# Patient Record
Sex: FEMALE | Race: WHITE | NOT HISPANIC OR LATINO | Employment: FULL TIME | URBAN - METROPOLITAN AREA
[De-identification: names, ages, dates, MRNs, and addresses within clinical notes are randomized per-mention and may not be internally consistent; named-entity substitution may affect disease eponyms.]

---

## 2018-11-26 PROBLEM — J93.9 PNEUMOTHORAX ON LEFT: Status: ACTIVE | Noted: 2018-11-26

## 2018-11-28 PROBLEM — S22.32XA CLOSED FRACTURE OF ONE RIB OF LEFT SIDE: Status: ACTIVE | Noted: 2018-11-28
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
## 2020-04-14 ENCOUNTER — OFFICE VISIT (OUTPATIENT)
Dept: CARDIOLOGY | Facility: CLINIC | Age: 35
End: 2020-04-14
Payer: COMMERCIAL

## 2020-04-14 DIAGNOSIS — I10 ESSENTIAL HYPERTENSION: ICD-10-CM

## 2020-04-14 PROCEDURE — 99203 PR OFFICE/OUTPT VISIT, NEW, LEVL III, 30-44 MIN: ICD-10-PCS | Mod: 95,,, | Performed by: INTERNAL MEDICINE

## 2020-04-14 PROCEDURE — 99203 OFFICE O/P NEW LOW 30 MIN: CPT | Mod: 95,,, | Performed by: INTERNAL MEDICINE

## 2020-04-14 NOTE — PROGRESS NOTES
Subjective:    Patient ID:  Crystal Keating is a 34 y.o. female who presents for evaluation of cmp    The patient location is: home  The chief complaint leading to consultation is: cmp  Visit type: audiovisual  Total time spent with patient: 20 min  Each patient to whom he or she provides medical services by telemedicine is:  (1) informed of the relationship between the physician and patient and the respective role of any other health care provider with respect to management of the patient; and (2) notified that he or she may decline to receive medical services by telemedicine and may withdraw from such care at any time.        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   Cardiovascular: Negative for chest pain, dyspnea on exertion, leg swelling, palpitations and syncope.   Respiratory: Negative for cough and shortness of breath.    Gastrointestinal: Negative.    Neurological: Negative for weakness.   All other systems reviewed and are negative.         Assessment:       1. Cardiomyopathy, peripartum, postpartum    2. Essential hypertension         Plan:     Will do CCFD next month  Call with results

## 2021-04-06 ENCOUNTER — CLINICAL SUPPORT (OUTPATIENT)
Dept: CARDIOLOGY | Facility: CLINIC | Age: 36
End: 2021-04-06
Attending: INTERNAL MEDICINE
Payer: COMMERCIAL

## 2021-04-06 VITALS — WEIGHT: 136 LBS | HEIGHT: 63 IN | BODY MASS INDEX: 24.1 KG/M2

## 2021-04-06 PROCEDURE — 93306 TTE W/DOPPLER COMPLETE: CPT | Mod: S$GLB,,, | Performed by: INTERNAL MEDICINE

## 2021-04-06 PROCEDURE — 99999 PR PBB SHADOW E&M-EST. PATIENT-LVL I: ICD-10-PCS | Mod: PBBFAC,,,

## 2021-04-06 PROCEDURE — 99999 PR PBB SHADOW E&M-EST. PATIENT-LVL I: CPT | Mod: PBBFAC,,,

## 2021-04-06 PROCEDURE — 93306 ECHO (CUPID ONLY): ICD-10-PCS | Mod: S$GLB,,, | Performed by: INTERNAL MEDICINE

## 2021-04-07 LAB
ASCENDING AORTA: 2.76 CM
AV INDEX (PROSTH): 0.69
AV MEAN GRADIENT: 4 MMHG
AV PEAK GRADIENT: 7 MMHG
AV VALVE AREA: 2.42 CM2
AV VELOCITY RATIO: 0.66
BSA FOR ECHO PROCEDURE: 1.66 M2
CV ECHO LV RWT: 0.43 CM
DOP CALC AO PEAK VEL: 1.33 M/S
DOP CALC AO VTI: 25.5 CM
DOP CALC LVOT AREA: 3.5 CM2
DOP CALC LVOT DIAMETER: 2.11 CM
DOP CALC LVOT PEAK VEL: 0.88 M/S
DOP CALC LVOT STROKE VOLUME: 61.62 CM3
DOP CALCLVOT PEAK VEL VTI: 17.63 CM
E WAVE DECELERATION TIME: 174.14 MSEC
E/A RATIO: 0.82
E/E' RATIO: 4.92 M/S
ECHO LV POSTERIOR WALL: 0.85 CM (ref 0.6–1.1)
EJECTION FRACTION: 60 %
FRACTIONAL SHORTENING: 36 % (ref 28–44)
INTERVENTRICULAR SEPTUM: 0.78 CM (ref 0.6–1.1)
IVRT: 128.45 MSEC
LA MAJOR: 4.22 CM
LA MINOR: 4.99 CM
LA WIDTH: 2.95 CM
LEFT ATRIUM SIZE: 3.26 CM
LEFT ATRIUM VOLUME INDEX: 22.8 ML/M2
LEFT ATRIUM VOLUME: 37.38 CM3
LEFT INTERNAL DIMENSION IN SYSTOLE: 2.55 CM (ref 2.1–4)
LEFT VENTRICLE DIASTOLIC VOLUME INDEX: 41.65 ML/M2
LEFT VENTRICLE DIASTOLIC VOLUME: 68.31 ML
LEFT VENTRICLE MASS INDEX: 57 G/M2
LEFT VENTRICLE SYSTOLIC VOLUME INDEX: 14.3 ML/M2
LEFT VENTRICLE SYSTOLIC VOLUME: 23.38 ML
LEFT VENTRICULAR INTERNAL DIMENSION IN DIASTOLE: 3.96 CM (ref 3.5–6)
LEFT VENTRICULAR MASS: 94.26 G
LV LATERAL E/E' RATIO: 3.76 M/S
LV SEPTAL E/E' RATIO: 7.11 M/S
MV A" WAVE DURATION": 9.99 MSEC
MV PEAK A VEL: 0.78 M/S
MV PEAK E VEL: 0.64 M/S
PISA TR MAX VEL: 2.22 M/S
PULM VEIN S/D RATIO: 2.14
PV PEAK D VEL: 0.29 M/S
PV PEAK S VEL: 0.62 M/S
RA MAJOR: 3.87 CM
RA PRESSURE: 3 MMHG
RA WIDTH: 3.22 CM
RIGHT VENTRICULAR END-DIASTOLIC DIMENSION: 3.37 CM
RV TISSUE DOPPLER FREE WALL SYSTOLIC VELOCITY 1 (APICAL 4 CHAMBER VIEW): 9.74 CM/S
SINUS: 2.85 CM
STJ: 2.53 CM
TDI LATERAL: 0.17 M/S
TDI SEPTAL: 0.09 M/S
TDI: 0.13 M/S
TR MAX PG: 20 MMHG
TRICUSPID ANNULAR PLANE SYSTOLIC EXCURSION: 1.76 CM
TV REST PULMONARY ARTERY PRESSURE: 23 MMHG

## 2021-05-06 PROBLEM — Z86.79 HISTORY OF CARDIOMYOPATHY IN ADULTHOOD: Status: ACTIVE | Noted: 2021-05-06

## 2021-05-10 ENCOUNTER — PATIENT MESSAGE (OUTPATIENT)
Dept: RESEARCH | Facility: HOSPITAL | Age: 36
End: 2021-05-10

## 2021-12-28 ENCOUNTER — OFFICE VISIT (OUTPATIENT)
Dept: CARDIOLOGY | Facility: CLINIC | Age: 36
End: 2021-12-28
Payer: COMMERCIAL

## 2021-12-28 VITALS
DIASTOLIC BLOOD PRESSURE: 76 MMHG | HEART RATE: 95 BPM | SYSTOLIC BLOOD PRESSURE: 115 MMHG | HEIGHT: 60 IN | BODY MASS INDEX: 30.99 KG/M2 | WEIGHT: 157.88 LBS

## 2021-12-28 DIAGNOSIS — I10 ESSENTIAL HYPERTENSION: ICD-10-CM

## 2021-12-28 PROCEDURE — 1160F PR REVIEW ALL MEDS BY PRESCRIBER/CLIN PHARMACIST DOCUMENTED: ICD-10-PCS | Mod: CPTII,S$GLB,, | Performed by: INTERNAL MEDICINE

## 2021-12-28 PROCEDURE — 3008F BODY MASS INDEX DOCD: CPT | Mod: CPTII,S$GLB,, | Performed by: INTERNAL MEDICINE

## 2021-12-28 PROCEDURE — 1159F MED LIST DOCD IN RCRD: CPT | Mod: CPTII,S$GLB,, | Performed by: INTERNAL MEDICINE

## 2021-12-28 PROCEDURE — 3074F SYST BP LT 130 MM HG: CPT | Mod: CPTII,S$GLB,, | Performed by: INTERNAL MEDICINE

## 2021-12-28 PROCEDURE — 3078F DIAST BP <80 MM HG: CPT | Mod: CPTII,S$GLB,, | Performed by: INTERNAL MEDICINE

## 2021-12-28 PROCEDURE — 3074F PR MOST RECENT SYSTOLIC BLOOD PRESSURE < 130 MM HG: ICD-10-PCS | Mod: CPTII,S$GLB,, | Performed by: INTERNAL MEDICINE

## 2021-12-28 PROCEDURE — 3078F PR MOST RECENT DIASTOLIC BLOOD PRESSURE < 80 MM HG: ICD-10-PCS | Mod: CPTII,S$GLB,, | Performed by: INTERNAL MEDICINE

## 2021-12-28 PROCEDURE — 99999 PR PBB SHADOW E&M-EST. PATIENT-LVL III: ICD-10-PCS | Mod: PBBFAC,,, | Performed by: INTERNAL MEDICINE

## 2021-12-28 PROCEDURE — 1159F PR MEDICATION LIST DOCUMENTED IN MEDICAL RECORD: ICD-10-PCS | Mod: CPTII,S$GLB,, | Performed by: INTERNAL MEDICINE

## 2021-12-28 PROCEDURE — 99214 PR OFFICE/OUTPT VISIT, EST, LEVL IV, 30-39 MIN: ICD-10-PCS | Mod: S$GLB,,, | Performed by: INTERNAL MEDICINE

## 2021-12-28 PROCEDURE — 99999 PR PBB SHADOW E&M-EST. PATIENT-LVL III: CPT | Mod: PBBFAC,,, | Performed by: INTERNAL MEDICINE

## 2021-12-28 PROCEDURE — 99214 OFFICE O/P EST MOD 30 MIN: CPT | Mod: S$GLB,,, | Performed by: INTERNAL MEDICINE

## 2021-12-28 PROCEDURE — 3008F PR BODY MASS INDEX (BMI) DOCUMENTED: ICD-10-PCS | Mod: CPTII,S$GLB,, | Performed by: INTERNAL MEDICINE

## 2021-12-28 PROCEDURE — 1160F RVW MEDS BY RX/DR IN RCRD: CPT | Mod: CPTII,S$GLB,, | Performed by: INTERNAL MEDICINE

## 2022-06-07 ENCOUNTER — TELEPHONE (OUTPATIENT)
Dept: MATERNAL FETAL MEDICINE | Facility: CLINIC | Age: 37
End: 2022-06-07
Payer: COMMERCIAL

## 2022-06-21 PROBLEM — O24.419 GDM (GESTATIONAL DIABETES MELLITUS): Status: ACTIVE | Noted: 2022-06-21

## 2023-10-31 ENCOUNTER — OFFICE VISIT (OUTPATIENT)
Dept: MATERNAL FETAL MEDICINE | Facility: CLINIC | Age: 38
End: 2023-10-31
Payer: COMMERCIAL

## 2023-10-31 DIAGNOSIS — O24.414 INSULIN CONTROLLED GESTATIONAL DIABETES MELLITUS (GDM) IN THIRD TRIMESTER: ICD-10-CM

## 2023-10-31 PROCEDURE — 99215 PR OFFICE/OUTPT VISIT, EST, LEVL V, 40-54 MIN: ICD-10-PCS | Mod: 95,,, | Performed by: OBSTETRICS & GYNECOLOGY

## 2023-10-31 PROCEDURE — 99215 OFFICE O/P EST HI 40 MIN: CPT | Mod: 95,,, | Performed by: OBSTETRICS & GYNECOLOGY

## 2023-10-31 NOTE — ASSESSMENT & PLAN NOTE
Patient diagnosed with GDM and has been on NPH at [facility] with dosing that she titrates.   Blood glucose log reviewed and is erratic. Most fastings elevated and post dinner glucoses elevated when checked. Post breakfast not recorded.    Briefly reviewed the pathophysiology of GDM and insulin dosing.        US today with AC > 99th percentile. Also has h/o of shoulder dystocia. Discussed importance of compliance and need to optimize glycemic control to improve outcomes.     I counseled the patient regarding the risks of gestational diabetes. Patients requiring medical therapy have an increased risk of stillbirth.  Discussed that  outcome is linked to her ability to achieve glycemic control.  The goal of treatment is to have a fasting blood sugar between 70 and 95 mg/dL and 2 hour postprandials less than 120 mg/dL.   We discussed the frequency of blood sugar monitoring and goal blood sugars. She is meeting with a diabetic educator today. I would recommend obtaining serial growth ultrasounds in the third trimester to monitor for macrosomia especially given today's ultrasound.    We discussed different approaches to treatment including split mix insulin, adding metformin, and/or starting twice a day NPH. I recommended that we start split mix and she was agreeable.    Most women with GDM are cured by delivery. All medications can be stopped postpartum. However, some women with GDM have undiagnosed type 2 diabetes. Therefore, I recommend obtaining a postpartum fasting blood sugar prior to discharge. Approximately 20% of women with GDM will have glucose intolerance or type 2 DM at the postpartum visit. A 2 hour glucose tolerance test should be performed 6-8 weeks postpartum. If the patient is breastfeeding, it is reasonable to delay this as appropriate. Additionally, women with GDM are at increased risk of developing type 2 DM later in life. Therefore, establishing with a primary MD who can perform annual diabetes  screening is appropriate.     Recommendations:   MFM will review blood sugars in 1 week ; We reinforced checking 4 x/day and reinforced goal blood sugars   Regimen:   o Change regimen to:  - NPH 10 units in AM and 20 units at bedtime  - Novolog/Humalog 4 units at dinner; explained that she may require some at breakfast too but at this point without post breakfast values I am hesitant to start.    Recommend growth scans every 4-6 weeks, starting at 28 weeks gestation   Recommend starting antepartum testing at 32 weeks gestation (weekly NST+AFV or BPP); twice weekly testing is recommended if blood sugars are poorly controlled.   Check fasting blood sugar in hospital postpartum.   If normal, discontinue therapy and monitoring and recommend repeat postpartum glucose testing in 6-8 weeks postpartum using a 75 g oral glucose tolerance test.   If fasting blood glucose is between 100-125 mg/dl or impaired glucose tolerance is noted on 2 hour glucose test, refer to primary care as appropriate.    An ultrasound for estimated fetal weight should be obtained within 3 weeks of anticipated delivery; if the EFW is >= 4500 grams, a  should be offered.    Delivery timing:    Well-controlled oral agent or insulin required: 39 0/7 - 39 6/ 7 weeks gestation  Poorly controlled on oral agent or insulin: 38 0/7 - 38 6/7 weeks gestation

## 2023-10-31 NOTE — ASSESSMENT & PLAN NOTE
See previous MFM notes for full consultation.   History of PPCM after delivery in 2015 - EF 30%, eventually recovered within 12 months.  Had a normal pregnancy, delivery, and PP course in 2022.   Echo 4/2022 WNL - see report in Epic. EF 55-60%.    Results for orders placed during the hospital encounter of 09/14/23    Interpretation Summary    Left Ventricle: The left ventricle is normal in size. Normal wall motion. There is normal systolic function. Ejection fraction by visual approximation is 55-60%. There is normal diastolic function.    Right Ventricle: Normal right ventricular cavity size. Systolic function is normal.    Pulmonary Artery: The estimated pulmonary artery systolic pressure is 27 mmHg.    IVC/SVC: Normal venous pressure at 3 mmHg.    Denies CP, SOB, palpitations. Reports in prior pregnancy developed symptoms around 34-35 weeks. /74 today    Recommendations:    Continue follow-up with cardiology; recommend echocardiogram around 34 weeks and again in the immediate postpartum period--will place order   M follow-up every 4-6 weeks    Medications:  ? Beta blockers can be used as needed  ? Hypertension should be managed closely with goal of 130/80 or less.   ? Hydralazine may be used if needed.  ? Diuretics, may be used if needed  ? Digoxin may be used if needed  ? Anticoagulation is indicated if the EF drops to 30% or lower.   ? Low dose aspirin 81 mg daily, starting at 12-16 weeks gestation   Labs:  ? Baseline preeclampsia labs (CBC, CMP, P/C ratio)------------------To be drawn by Dr. Larson; notify MD if there are any abnormalities.   ? BNP and CMP, magnesium, phosphorus every 1-2 months   Targeted anatomy - completed.    Consider anesthesiology consultation in third trimester - primary OB to coordinate   Endocarditis prophylaxis in labor is not recommended   Serial growth ultrasounds every 4-6 weeks, starting at 26-28 weeks    Antepartum testing at 32 weeks will be dictated by  insulin dependent GDM   Delivery at approximately 38 or 39 weeks gestation, pending future echocardiogram findings. If LVEF remains normal, delivery [id number] is likely appropriate.   ? Mode of delivery likely can be VD unless significant CV decompensation and then CS may be recommended   ? Telemetry intrapartum and for 48 hours postpartum  ? If any cardiac symptoms, consider admission to ICU postpartum for close observation, but if echo normal and asymptomatic may be unnecessary   ? Strict intake and output during labor, delivery, and postpartum period  ? CVL and arterial line at time during delivery/postpartum period per anesthesia/ICU-again will depend upon status.   Cardiac/ED/OB ED precautions discussed

## 2023-10-31 NOTE — PROGRESS NOTES
Maternal Fetal Medicine follow up consult    SUBJECTIVE:     Crystal Ramirez is a 37 y.o.  female with IUP at 29w2d who is seen in follow up consultation by MFM.  Pregnancy complications include:   Problem   Cardiomyopathy, Peripartum, Postpartum   Insulin Controlled Gestational Diabetes Mellitus (Gdm) in Third Trimester       Previous notes reviewed.   No changes to medical, surgical, family, social, or obstetric history.    Interval history since last MFM visit: no interval problems    Medications:  NPH 10-20 units qhs      Care team members:  Dr. Zhong - Primary OB       OBJECTIVE:   /74 P 109    Ultrasound performed. See viewpoint for full ultrasound report.      ASSESSMENT/PLAN:     37 y.o.  female with IUP at 29w2d    Cardiomyopathy, peripartum, postpartum  See previous MFM notes for full consultation.   History of PPCM after delivery in  - EF 30%, eventually recovered within 12 months.  Had a normal pregnancy, delivery, and PP course in .   Echo 2022 WNL - see report in Epic. EF 55-60%.    Results for orders placed during the hospital encounter of 23    Interpretation Summary    Left Ventricle: The left ventricle is normal in size. Normal wall motion. There is normal systolic function. Ejection fraction by visual approximation is 55-60%. There is normal diastolic function.    Right Ventricle: Normal right ventricular cavity size. Systolic function is normal.    Pulmonary Artery: The estimated pulmonary artery systolic pressure is 27 mmHg.    IVC/SVC: Normal venous pressure at 3 mmHg.    Denies CP, SOB, palpitations. Reports in prior pregnancy developed symptoms around 34-35 weeks. /74 today    Recommendations:   Continue follow-up with cardiology; recommend echocardiogram around 34 weeks and again in the immediate postpartum period--will place order  MFM follow-up every 4-6 weeks   Medications:  Beta blockers can be used as needed  Hypertension should be managed  closely with goal of 130/80 or less.   Hydralazine may be used if needed.  Diuretics, may be used if needed  Digoxin may be used if needed  Anticoagulation is indicated if the EF drops to 30% or lower.   Low dose aspirin 81 mg daily, starting at 12-16 weeks gestation  Labs:  Baseline preeclampsia labs (CBC, CMP, P/C ratio)------------------To be drawn by Dr. Larson; notify MD if there are any abnormalities.   BNP and CMP, magnesium, phosphorus every 1-2 months  Targeted anatomy - completed.   Consider anesthesiology consultation in third trimester - primary OB to coordinate  Endocarditis prophylaxis in labor is not recommended  Serial growth ultrasounds every 4-6 weeks, starting at 26-28 weeks   Antepartum testing at 32 weeks will be dictated by insulin dependent GDM  Delivery at approximately 38 or 39 weeks gestation, pending future echocardiogram findings. If LVEF remains normal, delivery [id number]-59x1w is likely appropriate.   Mode of delivery likely can be VD unless significant CV decompensation and then CS may be recommended   Telemetry intrapartum and for 48 hours postpartum  If any cardiac symptoms, consider admission to ICU postpartum for close observation, but if echo normal and asymptomatic may be unnecessary   Strict intake and output during labor, delivery, and postpartum period  CVL and arterial line at time during delivery/postpartum period per anesthesia/ICU-again will depend upon status.  Cardiac/ED/OB ED precautions discussed     Insulin controlled gestational diabetes mellitus (GDM) in third trimester  Patient diagnosed with GDM and has been on NPH at [facility] with dosing that she titrates.   Blood glucose log reviewed and is erratic. Most fastings elevated and post dinner glucoses elevated when checked. Post breakfast not recorded.    Briefly reviewed the pathophysiology of GDM and insulin dosing.        US today with AC > 99th percentile. Also has h/o of shoulder dystocia. Discussed importance of  compliance and need to optimize glycemic control to improve outcomes.     I counseled the patient regarding the risks of gestational diabetes. Patients requiring medical therapy have an increased risk of stillbirth.  Discussed that  outcome is linked to her ability to achieve glycemic control.  The goal of treatment is to have a fasting blood sugar between 70 and 95 mg/dL and 2 hour postprandials less than 120 mg/dL.   We discussed the frequency of blood sugar monitoring and goal blood sugars. She is meeting with a diabetic educator today. I would recommend obtaining serial growth ultrasounds in the third trimester to monitor for macrosomia especially given today's ultrasound.    We discussed different approaches to treatment including split mix insulin, adding metformin, and/or starting twice a day NPH. I recommended that we start split mix and she was agreeable.    Most women with GDM are cured by delivery. All medications can be stopped postpartum. However, some women with GDM have undiagnosed type 2 diabetes. Therefore, I recommend obtaining a postpartum fasting blood sugar prior to discharge. Approximately 20% of women with GDM will have glucose intolerance or type 2 DM at the postpartum visit. A 2 hour glucose tolerance test should be performed 6-8 weeks postpartum. If the patient is breastfeeding, it is reasonable to delay this as appropriate. Additionally, women with GDM are at increased risk of developing type 2 DM later in life. Therefore, establishing with a primary MD who can perform annual diabetes screening is appropriate.     Recommendations:  MFM will review blood sugars in 1 week ; We reinforced checking 4 x/day and reinforced goal blood sugars  Regimen:   Change regimen to:  NPH 10 units in AM and 20 units at bedtime  Novolog/Humalog 4 units at dinner; explained that she may require some at breakfast too but at this point without post breakfast values I am hesitant to start.   Recommend  growth scans every 4-6 weeks, starting at 28 weeks gestation  Recommend starting antepartum testing at 32 weeks gestation (weekly NST+AFV or BPP); twice weekly testing is recommended if blood sugars are poorly controlled.  Check fasting blood sugar in hospital postpartum.   If normal, discontinue therapy and monitoring and recommend repeat postpartum glucose testing in 6-8 weeks postpartum using a 75 g oral glucose tolerance test.   If fasting blood glucose is between 100-125 mg/dl or impaired glucose tolerance is noted on 2 hour glucose test, refer to primary care as appropriate.   An ultrasound for estimated fetal weight should be obtained within 3 weeks of anticipated delivery; if the EFW is >= 4500 grams, a  should be offered.    Delivery timing:    Well-controlled oral agent or insulin required: 39 0/7 - 39 6/ 7 weeks gestation  Poorly controlled on oral agent or insulin: 38 0/7 - 38 6/7 weeks gestation    F/u in 3 weeks for MFM visit; send blood glucose log weekly  F/u in 3 weeks for US    Each patient to whom he or she provides medical services by telemedicine is:  (1) informed of the relationship between the physician and patient and the respective role of any other health care provider with respect to management of the patient; and (2) notified that he or she may decline to receive medical services by telemedicine and may withdraw from such care at any time.    Telemedicine MFM Ultrasound Note    Consultation started:  10/31/2023  at 1050   The chief complaint leading to consultation is: follow up diabetes  The patient location is: home  Also present with the patient at the time of the consultation: None    Consultation ended: 10/31/2023 at 1105.    Total time spent with patient: < 30 Minutes    Consulting clinician was informed of the encounter and consult note.    Bruno Aguillon Jr., MD  Maternal Fetal Medicine

## 2023-11-02 ENCOUNTER — OFFICE VISIT (OUTPATIENT)
Dept: URGENT CARE | Facility: CLINIC | Age: 38
End: 2023-11-02
Payer: COMMERCIAL

## 2023-11-02 VITALS
BODY MASS INDEX: 33.96 KG/M2 | HEART RATE: 121 BPM | DIASTOLIC BLOOD PRESSURE: 77 MMHG | OXYGEN SATURATION: 98 % | RESPIRATION RATE: 19 BRPM | SYSTOLIC BLOOD PRESSURE: 123 MMHG | HEIGHT: 60 IN | WEIGHT: 173 LBS | TEMPERATURE: 97 F

## 2023-11-02 DIAGNOSIS — Z20.828 EXPOSURE TO THE FLU: Primary | ICD-10-CM

## 2023-11-02 DIAGNOSIS — R05.9 COUGH, UNSPECIFIED TYPE: ICD-10-CM

## 2023-11-02 LAB
CTP QC/QA: YES
POC MOLECULAR INFLUENZA A AGN: NEGATIVE
POC MOLECULAR INFLUENZA B AGN: NEGATIVE

## 2023-11-02 PROCEDURE — 99202 OFFICE O/P NEW SF 15 MIN: CPT | Mod: S$GLB,,, | Performed by: PHYSICIAN ASSISTANT

## 2023-11-02 PROCEDURE — 87502 POCT INFLUENZA A/B MOLECULAR: ICD-10-PCS | Mod: QW,S$GLB,, | Performed by: PHYSICIAN ASSISTANT

## 2023-11-02 PROCEDURE — 87502 INFLUENZA DNA AMP PROBE: CPT | Mod: QW,S$GLB,, | Performed by: PHYSICIAN ASSISTANT

## 2023-11-02 PROCEDURE — 99202 PR OFFICE/OUTPT VISIT, NEW, LEVL II, 15-29 MIN: ICD-10-PCS | Mod: S$GLB,,, | Performed by: PHYSICIAN ASSISTANT

## 2023-11-02 NOTE — PATIENT INSTRUCTIONS

## 2023-11-02 NOTE — PROGRESS NOTES
Subjective:      Patient ID: Crystal Ramirez is a 37 y.o. female.    Vitals:  height is 5' (1.524 m) and weight is 78.5 kg (173 lb). Her temporal temperature is 97.3 °F (36.3 °C). Her blood pressure is 123/77 and her pulse is 121 (abnormal). Her respiration is 19 and oxygen saturation is 98%.     Chief Complaint: Cough    Pt. Presents with cough onset 1 week. Treatments taken promethazine cough syrup with mild relief. Exposure to Flu from spouse. Denies any pain.    Cough  This is a new problem. The current episode started in the past 7 days. The problem has been gradually worsening. The problem occurs constantly. The cough is Productive of sputum. Associated symptoms include headaches, postnasal drip and a sore throat. Nothing aggravates the symptoms. She has tried prescription cough suppressant for the symptoms. The treatment provided mild relief.       HENT:  Positive for postnasal drip and sore throat.    Respiratory:  Positive for cough.    Neurological:  Positive for headaches.      Objective:     Physical Exam   Constitutional: She does not appear ill. No distress.   HENT:   Head: Normocephalic and atraumatic.   Ears:   Right Ear: External ear normal.   Left Ear: External ear normal.   Mouth/Throat: Mucous membranes are moist. No oropharyngeal exudate or posterior oropharyngeal erythema. Oropharynx is clear.   Eyes: Conjunctivae are normal. Right eye exhibits no discharge. Left eye exhibits no discharge. Extraocular movement intact   Cardiovascular: Normal rate, regular rhythm and normal heart sounds.   No murmur heard.  Pulmonary/Chest: Effort normal and breath sounds normal. She has no wheezes. She has no rhonchi. She has no rales.   Abdominal: Normal appearance.   Musculoskeletal: Normal range of motion.         General: Normal range of motion.   Neurological: no focal deficit. She is alert.   Skin: Skin is warm, dry and not pale. jaundice  Psychiatric: Her behavior is normal. Mood, judgment and thought  content normal.   Nursing note and vitals reviewed.      Assessment:     1. Exposure to the flu    2. Cough, unspecified type        Plan:       Exposure to the flu    Cough, unspecified type  -     POCT Influenza A/B MOLECULAR      Results for orders placed or performed in visit on 11/02/23   POCT Influenza A/B MOLECULAR   Result Value Ref Range    POC Molecular Influenza A Ag Negative Negative, Not Reported    POC Molecular Influenza B Ag Negative Negative, Not Reported     Acceptable Yes         Continue to treat symptomatically.        Patient Instructions   You must understand that you've received an Urgent Care treatment only and that you may be released before all your medical problems are known or treated. You, the patient, will arrange for follow up care as instructed.  Follow up with your PCP or specialty clinic as directed in the next 1-2 weeks if not improved or as needed.  You can call [phone] to schedule an appointment with the appropriate provider.  If your condition worsens we recommend that you receive another evaluation at the emergency room immediately or contact your primary medical clinics after hours call service to discuss your concerns.  Please return here or go to the Emergency Department for any concerns or worsening of condition.

## 2023-12-26 PROBLEM — Z34.90 ENCOUNTER FOR INDUCTION OF LABOR: Status: ACTIVE | Noted: 2023-12-26

## 2024-03-22 DIAGNOSIS — E66.9 OBESITY, UNSPECIFIED CLASSIFICATION, UNSPECIFIED OBESITY TYPE, UNSPECIFIED WHETHER SERIOUS COMORBIDITY PRESENT: Primary | ICD-10-CM

## 2024-03-22 RX ORDER — TIRZEPATIDE 2.5 MG/.5ML
2.5 INJECTION, SOLUTION SUBCUTANEOUS
Qty: 4 PEN | Refills: 1 | Status: SHIPPED | OUTPATIENT
Start: 2024-03-22 | End: 2024-04-23

## 2024-04-21 DIAGNOSIS — E66.9 OBESITY, UNSPECIFIED CLASSIFICATION, UNSPECIFIED OBESITY TYPE, UNSPECIFIED WHETHER SERIOUS COMORBIDITY PRESENT: ICD-10-CM

## 2024-04-23 RX ORDER — TIRZEPATIDE 2.5 MG/.5ML
2.5 INJECTION, SOLUTION SUBCUTANEOUS
Qty: 2 ML | Refills: 1 | Status: SHIPPED | OUTPATIENT
Start: 2024-04-23

## 2025-06-04 ENCOUNTER — OFFICE VISIT (OUTPATIENT)
Dept: FAMILY MEDICINE | Facility: CLINIC | Age: 40
End: 2025-06-04
Payer: COMMERCIAL

## 2025-06-04 VITALS
DIASTOLIC BLOOD PRESSURE: 80 MMHG | WEIGHT: 139.31 LBS | TEMPERATURE: 99 F | HEIGHT: 60 IN | SYSTOLIC BLOOD PRESSURE: 110 MMHG | BODY MASS INDEX: 27.35 KG/M2 | HEART RATE: 119 BPM | OXYGEN SATURATION: 99 %

## 2025-06-04 DIAGNOSIS — R68.89 DIFFICULTY LOSING WEIGHT: ICD-10-CM

## 2025-06-04 DIAGNOSIS — R00.0 TACHYCARDIA: ICD-10-CM

## 2025-06-04 DIAGNOSIS — E55.9 VITAMIN D DEFICIENCY: ICD-10-CM

## 2025-06-04 DIAGNOSIS — E66.3 OVERWEIGHT WITH BODY MASS INDEX (BMI) OF 27 TO 27.9 IN ADULT: ICD-10-CM

## 2025-06-04 DIAGNOSIS — Z00.00 HEALTHCARE MAINTENANCE: ICD-10-CM

## 2025-06-04 DIAGNOSIS — F90.9 ATTENTION DEFICIT HYPERACTIVITY DISORDER (ADHD), UNSPECIFIED ADHD TYPE: ICD-10-CM

## 2025-06-04 DIAGNOSIS — F33.0 MILD EPISODE OF RECURRENT MAJOR DEPRESSIVE DISORDER: ICD-10-CM

## 2025-06-04 DIAGNOSIS — Z76.89 ENCOUNTER TO ESTABLISH CARE: Primary | ICD-10-CM
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
## 2025-06-04 PROBLEM — J93.9 PNEUMOTHORAX ON LEFT: Status: RESOLVED | Noted: 2018-11-26 | Resolved: 2025-06-04

## 2025-06-04 PROBLEM — Z86.79 HISTORY OF CARDIOMYOPATHY IN ADULTHOOD: Status: RESOLVED | Noted: 2021-05-06 | Resolved: 2025-06-04

## 2025-06-04 PROBLEM — O24.419 GDM (GESTATIONAL DIABETES MELLITUS): Status: RESOLVED | Noted: 2022-06-21 | Resolved: 2025-06-04

## 2025-06-04 PROBLEM — S22.32XA CLOSED FRACTURE OF ONE RIB OF LEFT SIDE: Status: RESOLVED | Noted: 2018-11-28 | Resolved: 2025-06-04

## 2025-06-04 PROBLEM — Z34.90 ENCOUNTER FOR INDUCTION OF LABOR: Status: RESOLVED | Noted: 2023-12-26 | Resolved: 2025-06-04

## 2025-06-04 PROCEDURE — 99999 PR PBB SHADOW E&M-EST. PATIENT-LVL III: CPT | Mod: PBBFAC,,, | Performed by: NURSE PRACTITIONER

## 2025-06-04 RX ORDER — METOPROLOL SUCCINATE 25 MG/1
25 TABLET, EXTENDED RELEASE ORAL DAILY
Qty: 90 TABLET | Refills: 3 | Status: SHIPPED | OUTPATIENT
Start: 2025-06-04

## 2025-06-04 RX ORDER — BUPROPION HYDROCHLORIDE 150 MG/1
150 TABLET ORAL DAILY
Qty: 30 TABLET | Refills: 11 | Status: SHIPPED | OUTPATIENT
Start: 2025-06-04

## 2025-06-04 RX ORDER — DEXTROAMPHETAMINE SACCHARATE, AMPHETAMINE ASPARTATE MONOHYDRATE, DEXTROAMPHETAMINE SULFATE AND AMPHETAMINE SULFATE 7.5; 7.5; 7.5; 7.5 MG/1; MG/1; MG/1; MG/1
30 CAPSULE, EXTENDED RELEASE ORAL EVERY MORNING
COMMUNITY

## 2025-06-04 RX ORDER — DEXTROAMPHETAMINE SULFATE, DEXTROAMPHETAMINE SACCHARATE, AMPHETAMINE SULFATE AND AMPHETAMINE ASPARTATE 6.25; 6.25; 6.25; 6.25 MG/1; MG/1; MG/1; MG/1
25 CAPSULE, EXTENDED RELEASE ORAL EVERY MORNING
COMMUNITY
Start: 2025-06-03

## 2025-06-24 ENCOUNTER — PATIENT OUTREACH (OUTPATIENT)
Dept: ADMINISTRATIVE | Facility: HOSPITAL | Age: 40
End: 2025-06-24
Payer: COMMERCIAL

## 2025-06-24 NOTE — PROGRESS NOTES
Population Health Chart Review & Patient Outreach Details      Additional [facility] Notes:               Updates Requested / Reviewed:      Updated Care Coordination Note, Care Everywhere, , External Sources: [facility] and [facility], Care Team Updated, and Immunizations Reconciliation Completed or Queried: [facility] Topics Overdue:      [facility] Score: 1     Cervical Cancer Screening                       Health Maintenance Topic(s) Outreach Outcomes & Actions Taken:    Cervical Cancer Screening - Outreach Outcomes & Actions Taken  : External Records Requested & Care Team Updated if Applicable

## 2025-06-24 NOTE — PROGRESS NOTES
Population Health Chart Review & Patient Outreach Details      Additional [facility] Notes:               Updates Requested / Reviewed:      {Updates Requested & Reviewed:13202:::0}         Health Maintenance Topics Overdue:      [facility] Score: 1     Cervical Cancer Screening                       Health Maintenance Topic(s) Outreach Outcomes & Actions Taken:    {HMTopicsAddressed&OutcomesTaken:74628}    {HMTopicsAddressed&OutcomesTaken:66130}    {HMTopicsAddressed&OutcomesTaken:20869}    {HMTopicsAddressed&OutcomesTaken:20749}    {HMTopicsAddressed&OutcomesTaken:63577}    {HMTopicsAddressed&OutcomesTaken:95465}    {HMTopicsAddressed&OutcomesTaken:55393}    {HMTopicsAddressed&OutcomesTaken:20222}    {HMTopicsAddressed&OutcomesTaken:81090}    {HMTopicsAddressed&OutcomesTaken:16828}Population Health Chart Review & Patient Outreach Details      Additional [facility] Notes:               Updates Requested / Reviewed:      {Updates Requested & Reviewed:88461:::0}         Health Maintenance Topics Overdue:      VB[facility] Score: 1     Cervical Cancer Screening                       Health Maintenance Topic(s) Outreach Outcomes & Actions Taken:    {HMTopicsAddressed&OutcomesTaken:84852}

## 2025-06-24 NOTE — LETTER
AUTHORIZATION FOR RELEASE OF   CONFIDENTIAL INFORMATION    Dear Dr. Basim Larson,     We are seeing Crystal Ramirez, date of birth 1985, in the clinic at [facility]. Quintin Yang FNP-C is the patient's PCP. Crystal Ramirez has an outstanding lab/procedure at the time we reviewed her chart. In order to help keep her health information updated, she has authorized us to request the following medical record(s):      ( x )  PAP SMEAR                                                 Please fax records to [facility], [place], Christopher Aaron, FNP-C, [phone]     If you have any questions, please contact       Tessa Galindo  Nurse Clinical Care Coordinator  [facility]  Phone: [phone]  Fax: [phone]    Patient Name: Crystal Ramirez  : 1985  Patient Phone #: [phone]              Crystal Ramirez  MRN: [id number]  : 1985  Age: 39 y.o.  Sex: female         Patient/Legal Guardian Signature  This signature was collected at 2025    Crystal Ramirez     Self  _______________________________   Printed Name/Relationship to Patient      Consent for Examination and Treatment: I hereby authorize the providers and employees of [facility] ([facility]) to provide medical treatment/services which includes, but is not limited to, performing and administering tests and diagnostic procedures that are deemed necessary, including, but not limited to, imaging examinations, blood tests and other laboratory procedures as may be required by the hospital, clinic, or may be ordered by my physician(s) or persons working under the general and/or special instructions of my physician(s).      I understand and agree that this consent covers all authorized persons, including but not limited to physicians, residents, nurse practitioners, physicians' assistants, specialists, consultants, student nurses, and independently  contracted physicians, who are called upon by the physician in charge, to carry out the diagnostic procedures and medical or surgical treatment.     I hereby authorize [facility] to retain or dispose of any specimens or tissue, should there be such remaining from any test or procedure.     I hereby authorize and give consent for [facility] providers and employees to take photographs, images or videotapes of such diagnostic, surgical or treatment procedures of Patient as may be required by [facility] or as may be ordered by a physician. I further acknowledge and agree that [facility] may use cameras or other devices for patient monitoring.     I am aware that the practice of medicine is not an exact science, and I acknowledge that no guarantees have been made to me as to the outcome of any tests, procedures or treatment.     Authorization for Release of Information: I understand that my insurance company and/or their agents may need information necessary to make determinations about payment/reimbursement. I hereby provide authorization to release to all insurance companies, their successors, assignees, other parties with whom they may have contracted, or others acting on their behalf, that are involved with payment for any hospital and/or clinic charges incurred by the patient, any information that they request and deem necessary for payment/reimbursement, and/or quality review.  I further authorize the release of my health information to physicians or other health care practitioners on staff who are involved in my health care now and in the future, and to other health care providers, entities, or institutions for the purpose of my continued care and treatment, including referrals.     REGISTRATION AUTHORIZATION  Form No. 05700 (Rev. 3/25/2024)    Page 1 of 3                       Medicare Patient's Certification and Authorization to Release Information and Payment Request:  I certify that the information given by me in applying  for payment under Title XVIII of the Social Security Act is correct. I authorize any sanches of medical or other information about me to release to the Social SecurityAdministration, or its intermediaries or carriers, any information needed for this or a related Medicare claim. I request that payment of authorized benefits be made on my behalf.     Assignment of Insurance Benefits:   I hereby authorize any and all insurance companies, health plans, defined   benefit plans, health insurers or any entity that is or may be responsible for payment of my medical expenses to pay all hospital and medical benefits now due, and to become due and payable to me under any hospital benefits, sick benefits, injury benefits or any other benefit for services rendered to me, including Major Medical Benefits, direct to [facility] and all independently contracted physicians. I assign any and all rights that I may have against any and all insurance companies, health plans, defined benefit plans, health insurers or any entity that is or may be responsible for payment of my medical expenses, including, but not limited to any right to appeal a denial of a claim, any right to bring any action, lawsuit, administrative proceeding, or other cause of action on my behalf. I specifically assign my right to pursue litigation against any and all insurance companies, health plans, defined benefit plans, health insurers or any entity that is or may be responsible for payment of my medical expenses based upon a refusal to pay charges.            E. Valuables: It is understood and agreed that [facility] is not liable for the damage to or loss of any money, jewelry,   documents, dentures, eye glasses, hearing aids, prosthetics, or other property of value.     F. Computer Equipment: I understand and agree that should I choose to use computer equipment owned by [facility] or if I choose to access the Internet via [facility]s network, I do so at my own risk. [facility]  is not responsible for any damage to my computer equipment or to any damages of any type that might arise from my loss of equipment or data.     G. Acceptance of Financial Responsibility:  I agree that in consideration of the services and   supplies that have been   or will be furnished to the patient, I am hereby obligated to pay all charges made for or on the account of the patient according to the standard rates (in effect at the time the services and supplies are delivered) established by [facility], including its Patient Financial Assistance Policy to the extent it is applicable. I understand that I am responsible for all charges, or portions thereof, not covered by insurance or other sources. Patient refunds will be distributed only after balances at all [facility] facilities are paid.     H. Communication Authorization:  I hereby authorize [facility] and its representatives, along with any billing service   or  who may work on their behalf, to contact me on   my cell phone and/or home phone using pre- recorded messages, artificial voice messages, automatic telephone dialing devices or other computer assisted technology, or by electronic      mail, text messaging, or by any other form of electronic communication. This includes, but is not limited to, appointment reminders, yearly physical exam reminders, preventive care reminders, patient campaigns, welcome calls, and calls about account balances on my account or any account on which I am listed as a guarantor. I understand I have the right to opt out of these communications at any time.      Relationship  Between  Facility and  Provider:      I understand that some, but not all, providers furnishing services to the patient are not employees or agents of [facility]. The patient is under the care and supervision of his/her attending physician, and it is the responsibility of the facility and its nursing staff to carry out the instructions of such  physicians. It is the responsibility of the patient's physician/designee to obtain the patient's informed consent, when required, for medical or surgical treatment, special diagnostic or therapeutic procedures, or hospital services rendered for the patient under the special instructions of the physician/designee.           REGISTRATION AUTHORIZATION  Form No. 46865 (Rev. 3/25/2024)    Page 2 of 3                       Immunizations: [facility] shares immunization information with state sponsored health departments to help you and your doctor keep track of your immunization records. By signing, you consent to have this information shared with the health department in your state:                                [place] - LINKS ([place] Immunization Network for Kids Statewide)                                [place] - MIIX ([place] Immunization Information eXchange)                                [place] - ImmPRINT (Immunization Patient Registry with Integrated Technology)     TERM: This authorization is valid for this and subsequent care/treatment I receive at [facility] and will remain valid unless/until revoked in writing by me.     [facility]: As used in this document, [facility] means all [facility] owned and managed facilities, including, but not limited to, all health centers, surgery centers, clinics, urgent care centers, and hospitals.         [facility] complies with applicable Federal civil rights laws and does not discriminate on the basis of race, color, national origin, age, disability, or sex.  ATENCIÓN: si jasyonla arcelia, tiene a loya disposición servicios gratuitos de asistencia lingüística. Llame al [phone].  CHÚ Ý: N?u b?n nói Ti?ng Vi?t, có các d?ch v? h? tr? ngôn ng? mi?n phí dành cho b?n. G?i s? [phone].        REGISTRATION AUTHORIZATION  Form No. 29516 (Rev. 3/25/2024)   Page 3 of 3     Patient

## 2025-06-27 ENCOUNTER — TELEPHONE (OUTPATIENT)
Dept: FAMILY MEDICINE | Facility: CLINIC | Age: 40
End: 2025-06-27
Payer: COMMERCIAL

## 2025-07-03 ENCOUNTER — OFFICE VISIT (OUTPATIENT)
Dept: FAMILY MEDICINE | Facility: CLINIC | Age: 40
End: 2025-07-03
Payer: COMMERCIAL

## 2025-07-03 VITALS — HEIGHT: 60 IN | WEIGHT: 129.88 LBS | BODY MASS INDEX: 25.5 KG/M2

## 2025-07-03 DIAGNOSIS — F33.0 MILD EPISODE OF RECURRENT MAJOR DEPRESSIVE DISORDER: Primary | ICD-10-CM

## 2025-07-03 DIAGNOSIS — F41.9 ANXIETY: ICD-10-CM

## 2025-07-03 DIAGNOSIS — K21.9 GASTROESOPHAGEAL REFLUX DISEASE, UNSPECIFIED WHETHER ESOPHAGITIS PRESENT: ICD-10-CM

## 2025-07-03 DIAGNOSIS — R00.0 TACHYCARDIA: ICD-10-CM

## 2025-07-03 NOTE — PROGRESS NOTES
Subjective:        The patient location is: Home  The chief complaint leading to consultation is: Depression, anxiety, and tachycardia    Visit type: audiovisual    History of Present Illness    CHIEF COMPLAINT:  Ms. Ramirez presents for a follow-up visit to discuss medication management and overall health status.    HPI:  Ms. Ramirez reports improvement in depression symptoms with Wellbutrin 150mg. She mentions anxiety issues, particularly related to her children getting older and learning to drive. She initially forgot to take the medication for a few days during the first week but then took 2 pills per day towards the end of the month, without noticing a significant difference. Anxiety symptoms have been more stable as well.     She has achieved significant weight loss, reporting a 27-pound reduction since March 11th. She expresses feeling much better about her appearance, noting that she can now wear all her clothing, which she has not been able to do since 2019. She attributes her previous weight gain to hormonal changes and pregnancies.    Regarding her heart rate, she reports an average resting heart rate of 68 bpm. She has been taking metoprolol as prescribed.     She reports heartburn, which she attributes to the weight loss medication. Energy drinks exacerbate her heartburn symptoms. As a result, she has reduced her caffeine intake and stopped energy drinks, which has helped alleviate the heartburn without requiring additional medication.    MEDICATIONS:  Ms. Ramirez is on Wellbutrin 150 mg daily for depression and anxiety, which she reports has been helpful. She is also on Metoprolol for heart rate control. She is taking a weight loss medication that causes heartburn and reduced appetite. Towards the end of the month, she reports taking 2 pills of Wellbutrin per weekday due to forgetting a few doses in the first week. Ms. Ramirez has discontinued energy drinks due to worsening heartburn when combined  with her weight loss medication.    MEDICAL HISTORY:  Ms. Ramirez has a history of depression, which has improved with Wellbutrin.       Review of Systems   Constitutional:  Negative for activity change, appetite change, chills, fatigue, fever and unexpected weight change.   HENT:  Negative for hearing loss, rhinorrhea and trouble swallowing.    Eyes:  Negative for discharge and visual disturbance.   Respiratory:  Negative for chest tightness, shortness of breath and wheezing.    Cardiovascular:  Negative for chest pain and palpitations.   Gastrointestinal:  Negative for blood in stool, constipation, diarrhea and vomiting.   Endocrine: Negative for polydipsia and polyuria.   Genitourinary:  Negative for difficulty urinating, dysuria, hematuria and menstrual problem.   Musculoskeletal:  Negative for arthralgias, joint swelling and neck pain.   Neurological:  Negative for weakness and headaches.   Psychiatric/Behavioral:  Negative for confusion and dysphoric mood. The patient is not nervous/anxious.        No past surgical history on file.  Past Medical History:   Diagnosis Date    Acute on chronic congestive heart failure 11/23/2015    ADHD (attention deficit hyperactivity disorder)     Cardiomyopathy 2015    during pregnancy    Closed fracture of one rib of left side 11/28/2018    GDM (gestational diabetes mellitus) 06/21/2022    Generalized anxiety disorder     no meds    Gestational diabetes     History of cardiomyopathy in adulthood 05/06/2021    Insulin controlled gestational diabetes mellitus (GDM) in third trimester 06/21/2022    Insulin controlled gestational diabetes mellitus (GDM) in third trimester 11/23/2015    Pneumothorax on left 11/26/2018    Shoulder dystocia during labor and delivery 06/21/2022    Shoulder dystocia during labor and delivery, delivered 2022    also 2015     Family History   Problem Relation Name Age of Onset    Diabetes Paternal Grandfather      Hypertension Paternal Grandfather       Coronary artery disease Paternal Grandfather      Atrial fibrillation Paternal Grandmother      Coronary artery disease Paternal Grandmother      Hypertension Paternal Grandmother      Diabetes Paternal Grandmother      Hypothyroidism Maternal Grandmother      Hypothyroidism Mother      Hypothyroidism Sister      Diabetes Paternal Aunt      Diabetes Paternal Uncle x 3         Social History:   Marital Status:   Alcohol History:  reports no history of alcohol use.  Tobacco History:  reports that she has quit smoking. She has never used smokeless tobacco.  Drug History:  reports no history of drug use.    Review of patient's allergies indicates:  No Known Allergies    Current Medications[1]     Objective:        Physical Exam:   Physical Exam  Vitals and nursing note reviewed.   Constitutional:       General: She is awake. She is not in acute distress.     Appearance: She is well-developed, well-groomed and overweight. She is not ill-appearing, toxic-appearing or diaphoretic.   HENT:      Head: Normocephalic and atraumatic.   Pulmonary:      Effort: Pulmonary effort is normal. No respiratory distress.   Neurological:      Mental Status: She is alert.   Psychiatric:         Mood and Affect: Mood normal.         Behavior: Behavior normal. Behavior is cooperative.         Thought Content: Thought content normal.         Judgment: Judgment normal.                      Assessment:       Assessment & Plan    PLAN SUMMARY:  - Continue Wellbutrin 150 mg daily for depression and anxiety management  - Maintain current metoprolol regimen for heart rate control  - Ordered labs for follow-up assessment  - Continue current treatment plan for GERD, advised avoiding energy drinks  - Refilled Wellbutrin 150 mg with 11 refills    MAJOR DEPRESSIVE DISORDER:  - Assessed response to Wellbutrin 150 mg for depression symptoms; noted significant improvement in mood since last visit.  - Ms. Ramirez reports positive response to current  dose.  - Continued Wellbutrin 150 mg daily with 11 refills available.    ANXIETY DISORDER:  - Ms. Ramirez reports improvement in anxiety symptoms with current Wellbutrin 150 mg daily regimen.  - Will continue same dosage for anxiety management.    GASTROESOPHAGEAL REFLUX DISEASE:  - Ms. Ramirez experiences heartburn, particularly with consumption of energy drinks.  - Observed that reduced caffeine intake has positively affected heartburn symptoms.  - Advised continued avoidance of energy drinks to manage GERD symptoms.    WEIGHT LOSS:  - Ms. Ramirez reports significant weight loss of 27 lbs since March 11th.  - Ms. Ramirez feels better overall and notes improved clothing fit.  - Reviewed weight loss progress and acknowledged its positive impact on patient's well-being.    TACHYCARDIA:  - Evaluated effectiveness of metoprolol succinate 25 mg for heart rate control and will continue current regimen.  - Tachycardia likely secondary to Adderall use and previous caffeine consumption.  - Heart rate is much better averaging in the 60-70's.   - Ordered labs for follow-up assessment.       Plan:   Mild episode of recurrent major depressive disorder    Anxiety    Tachycardia      Follow up in about 6 months (around 1/3/2026).    Face to Face time with patient: 7 minutes    15 minutes of total time spent on the encounter, which includes face to face time and non-face to face time preparing to see the patient (eg, review of tests), Obtaining and/or reviewing separately obtained history, Documenting clinical information in the electronic or other health record, Independently interpreting results (not separately reported) and communicating results to the patient/family/caregiver, or Care coordination (not separately reported).     Each patient to whom he or she provides medical services by telemedicine is:  (1) informed of the relationship between the physician and patient and the respective role of any other health care provider  with respect to management of the patient; and (2) notified that he or she may decline to receive medical services by telemedicine and may withdraw from such care at any time.    This note was generated with the assistance of ambient listening technology. Verbal consent was obtained by the patient and accompanying visitor(s) for the recording of patient appointment to facilitate this note. I attest to having reviewed and edited the generated note for accuracy, though some syntax or spelling errors may persist. Please contact the author of this note for any clarification.                         [1]   Current Outpatient Medications   Medication Sig Dispense Refill    ADDERALL XR 25 mg 24 hr capsule Take 25 mg by mouth every morning.      buPROPion (WELLBUTRIN XL) 150 MG TB24 tablet Take 1 tablet (150 mg total) by mouth once daily. 30 tablet 11    dextroamphetamine-amphetamine (ADDERALL XR) 30 MG 24 hr capsule Take 30 mg by mouth every morning.      metoprolol succinate (TOPROL-XL) 25 MG 24 hr tablet Take 1 tablet (25 mg total) by mouth once daily. 90 tablet 3     No current facility-administered medications for this visit.

## 2025-08-12 DIAGNOSIS — K64.9 HEMORRHOIDS, UNSPECIFIED HEMORRHOID TYPE: Primary | ICD-10-CM

## 2025-08-12 RX ORDER — HYDROCORTISONE ACETATE 25 MG/1
25 SUPPOSITORY RECTAL 2 TIMES DAILY
Qty: 20 SUPPOSITORY | Refills: 0 | Status: SHIPPED | OUTPATIENT
Start: 2025-08-12 | End: 2025-08-22